# Patient Record
Sex: MALE | Race: WHITE
[De-identification: names, ages, dates, MRNs, and addresses within clinical notes are randomized per-mention and may not be internally consistent; named-entity substitution may affect disease eponyms.]

---

## 2018-03-08 ENCOUNTER — HOSPITAL ENCOUNTER (EMERGENCY)
Dept: HOSPITAL 80 - FED | Age: 43
Discharge: HOME | End: 2018-03-08
Payer: MEDICAID

## 2018-03-08 VITALS
HEART RATE: 76 BPM | TEMPERATURE: 97.7 F | RESPIRATION RATE: 16 BRPM | SYSTOLIC BLOOD PRESSURE: 155 MMHG | OXYGEN SATURATION: 99 % | DIASTOLIC BLOOD PRESSURE: 90 MMHG

## 2018-03-08 DIAGNOSIS — L30.9: Primary | ICD-10-CM

## 2018-03-08 NOTE — EDPHY
General


Time Seen by Provider: 03/08/18 23:20


Narrative: 





CHIEF COMPLAINT: 


Rash





HISTORY OF PRESENT ILLNESS: 


Patient complains of rash to both arms.  He 1st noticed this several days ago.  

It was mild at 1st but now moderate to severe.  It is mildly pruritic at times.

  Nonpainful.  Nonbleeding.  No fever.  No rash to the face, neck, or back.  

There is some involvement of the abdomen.  No involvement of the knee.  No new 

known exposures.  He has attempted to "deep cleanse" and has applied topical 

aloe.  No improvement.  No worsening.  No other associated complaints or 

modifying factors.





REVIEW OF SYSTEMS:


Ten systems reviewed and are negative unless otherwise noted in the HPI





PCP:


None





SPECIALISTS:


None





PAST MEDICAL HISTORY: 


None





PAST SURGICAL HISTORY:


None





SOCIAL HISTORY:


Nonsmoker.  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





FAMILY HISTORY:


None





EXAMINATION


General Appearance:  Alert, no distress


Head: normocephalic, atraumatic


Eyes:  Pupils equal and round, no conjunctival pallor or injection


ENT, Mouth:  Mucous membranes moist.  Airway is widely patent


Respiratory:  No retractions or distress


Skin:  Warm and dry.  Diffuse dermatitis to the posterior aspects of bilateral 

upper extremities, both the forearm and brachium.  This is a plaque type rash 

with scales consistent with psoriasis.  No petechiae or purpura


Extremities:  Nontender, no pedal edema.  Symmetric range of motion.


Psychiatric:  Mood and affect normal





DIFFERENTIAL DIAGNOSES:


Including but not limited to acute dermatitis,, dermatitis, eczema, psoriasis








MDM:


11:20 p.m.


Acute dermatitis of bilateral upper extremities that is consistent with 

psoriasis.  He has no arthralgia or signs of systemic illness.  Vital signs are 

within normal limits.  No petechiae or purpura.  No signs of infection.  I will 

treat him with topical triamcinolone 3 times daily.  We discuss follow-up with 

dermatology.  We discussed follow up with primary care physician, and I will 

provide the on-call information for him.  We discussed oral antihistamines over-

the-counter.  We discussed ED precautions.  He is comfortable this plan and 

discharged home stable condition.








SUPERVISION:


This patient was independently evaluated without direct involvement of or 

examination by the attending physician. 








- History


Smoking Status: Never smoked





- Objective


Vital Signs: 


 Initial Vital Signs











Temperature (C)  97.7 F   03/08/18 22:55


 


Heart Rate  76   03/08/18 22:55


 


Respiratory Rate  16   03/08/18 22:55


 


Blood Pressure  155/90 H  03/08/18 22:55


 


O2 Sat (%)  99   03/08/18 22:55








 











O2 Delivery Mode               Room Air














Allergies/Adverse Reactions: 


 





No Known Allergies Allergy (Unverified 03/08/18 22:54)


 








Home Medications: 














 Medication  Instructions  Recorded


 


Triamcinolone 0.1% [Triamcinolone 1 byron TP TID #1 cream 03/08/18





0.1% Cream]  














Departure





- Departure


Disposition: Home, Routine, Self-Care


Clinical Impression: 


 Acute dermatitis





Condition: Good


Instructions:  Psoriasis (ED), Dermatitis (ED)


Additional Instructions: 


1. Triamcinolone topical 3 times daily as prescribed


2. Follow up with Dermatology for definitive care


3. ED precautions as discussed 


Referrals: 


NONE *PRIMARY CARE P,. [Primary Care Provider] - As per Instructions


Prescriptions: 


Triamcinolone 0.1% [Triamcinolone 0.1% Cream] 1 byron TP TID #1 cream